# Patient Record
Sex: FEMALE | Race: OTHER
[De-identification: names, ages, dates, MRNs, and addresses within clinical notes are randomized per-mention and may not be internally consistent; named-entity substitution may affect disease eponyms.]

---

## 2018-06-12 ENCOUNTER — HOSPITAL ENCOUNTER (EMERGENCY)
Dept: HOSPITAL 72 - EMR | Age: 33
LOS: 1 days | Discharge: HOME | End: 2018-06-13
Payer: SELF-PAY

## 2018-06-12 VITALS — SYSTOLIC BLOOD PRESSURE: 162 MMHG | DIASTOLIC BLOOD PRESSURE: 89 MMHG

## 2018-06-12 VITALS — HEIGHT: 66 IN | BODY MASS INDEX: 43.39 KG/M2 | WEIGHT: 270 LBS

## 2018-06-12 VITALS — DIASTOLIC BLOOD PRESSURE: 85 MMHG | SYSTOLIC BLOOD PRESSURE: 148 MMHG
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
## 2018-06-12 DIAGNOSIS — I10: ICD-10-CM

## 2018-06-12 DIAGNOSIS — S89.91XA: Primary | ICD-10-CM

## 2018-06-12 DIAGNOSIS — S20.219A: ICD-10-CM

## 2018-06-12 PROCEDURE — 71250 CT THORAX DX C-: CPT

## 2018-06-12 PROCEDURE — 84703 CHORIONIC GONADOTROPIN ASSAY: CPT

## 2018-06-12 PROCEDURE — 36415 COLL VENOUS BLD VENIPUNCTURE: CPT

## 2018-06-12 PROCEDURE — 99284 EMERGENCY DEPT VISIT MOD MDM: CPT

## 2018-06-12 PROCEDURE — 29530 STRAPPING OF KNEE: CPT

## 2018-06-13 VITALS — SYSTOLIC BLOOD PRESSURE: 151 MMHG | DIASTOLIC BLOOD PRESSURE: 85 MMHG
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
